# Patient Record
Sex: MALE | Race: OTHER | Employment: FULL TIME | URBAN - METROPOLITAN AREA
[De-identification: names, ages, dates, MRNs, and addresses within clinical notes are randomized per-mention and may not be internally consistent; named-entity substitution may affect disease eponyms.]

---

## 2019-05-23 ENCOUNTER — HOSPITAL ENCOUNTER (EMERGENCY)
Age: 19
Discharge: HOME | End: 2019-05-23
Attending: EMERGENCY MEDICINE | Admitting: EMERGENCY MEDICINE
Payer: SUBSIDIZED

## 2019-05-23 VITALS
TEMPERATURE: 99.9 F | DIASTOLIC BLOOD PRESSURE: 70 MMHG | OXYGEN SATURATION: 99 % | WEIGHT: 135 LBS | RESPIRATION RATE: 16 BRPM | SYSTOLIC BLOOD PRESSURE: 124 MMHG | HEART RATE: 100 BPM

## 2019-05-23 DIAGNOSIS — Z23 NEED FOR TETANUS BOOSTER: ICD-10-CM

## 2019-05-23 DIAGNOSIS — L05.91 PILONIDAL CYST WITHOUT ABSCESS: Primary | ICD-10-CM

## 2019-05-23 PROCEDURE — 74011250637 HC RX REV CODE- 250/637: Performed by: PHYSICIAN ASSISTANT

## 2019-05-23 PROCEDURE — 77030019895 HC PCKNG STRP IODO -A

## 2019-05-23 PROCEDURE — 90715 TDAP VACCINE 7 YRS/> IM: CPT | Performed by: PHYSICIAN ASSISTANT

## 2019-05-23 PROCEDURE — 75810000289 HC I&D ABSCESS SIMP/COMP/MULT

## 2019-05-23 PROCEDURE — 90471 IMMUNIZATION ADMIN: CPT

## 2019-05-23 PROCEDURE — 99283 EMERGENCY DEPT VISIT LOW MDM: CPT

## 2019-05-23 PROCEDURE — 74011250636 HC RX REV CODE- 250/636: Performed by: PHYSICIAN ASSISTANT

## 2019-05-23 PROCEDURE — 74011000250 HC RX REV CODE- 250: Performed by: EMERGENCY MEDICINE

## 2019-05-23 RX ORDER — METRONIDAZOLE 500 MG/1
500 TABLET ORAL 2 TIMES DAILY
Qty: 14 TAB | Refills: 0 | Status: SHIPPED | OUTPATIENT
Start: 2019-05-23 | End: 2019-05-30

## 2019-05-23 RX ORDER — LIDOCAINE HYDROCHLORIDE AND EPINEPHRINE 10; 10 MG/ML; UG/ML
1.5 INJECTION, SOLUTION INFILTRATION; PERINEURAL
Status: COMPLETED | OUTPATIENT
Start: 2019-05-23 | End: 2019-05-23

## 2019-05-23 RX ORDER — METRONIDAZOLE 250 MG/1
500 TABLET ORAL
Status: COMPLETED | OUTPATIENT
Start: 2019-05-23 | End: 2019-05-23

## 2019-05-23 RX ORDER — CEPHALEXIN 250 MG/1
500 CAPSULE ORAL
Status: COMPLETED | OUTPATIENT
Start: 2019-05-23 | End: 2019-05-23

## 2019-05-23 RX ORDER — CEPHALEXIN 500 MG/1
500 CAPSULE ORAL 3 TIMES DAILY
Qty: 21 CAP | Refills: 0 | Status: SHIPPED | OUTPATIENT
Start: 2019-05-23 | End: 2019-05-30

## 2019-05-23 RX ADMIN — TETANUS TOXOID, REDUCED DIPHTHERIA TOXOID AND ACELLULAR PERTUSSIS VACCINE, ADSORBED 0.5 ML: 5; 2.5; 8; 8; 2.5 SUSPENSION INTRAMUSCULAR at 20:03

## 2019-05-23 RX ADMIN — METRONIDAZOLE 500 MG: 250 TABLET ORAL at 18:54

## 2019-05-23 RX ADMIN — CEPHALEXIN 500 MG: 250 CAPSULE ORAL at 18:54

## 2019-05-23 RX ADMIN — LIDOCAINE HYDROCHLORIDE,EPINEPHRINE BITARTRATE 15 MG: 10; .01 INJECTION, SOLUTION INFILTRATION; PERINEURAL at 19:28

## 2019-05-23 NOTE — DISCHARGE INSTRUCTIONS
Patient Education        Absceso pilonidal: Instrucciones de cuidado - [ Pilonidal Abscess: Care Instructions ]  Instrucciones de cuidado    Un absceso pilonidal es noe infección provocada por un pelo encarnado. Dimple absceso se presenta en la nika del cóccix (base de la columna) y en la parte superior de las nalgas. La infección hace que se forme un saco de pus. Eso puede ser bastante doloroso. Es posible que el médico haya abierto y [place]. Puede cuidarse en johnson hogar para ayudar a sanar la nika. En algunos casos, el absceso vuelve a aparecer. Si es así, el médico podría sugerirle que se opere para eliminar el sitio de la infección. Es posible que le hayan dado un sedante para ayudarle a relajarse. Usted podría estar algo tambaleante después de la sedación. Pueden pasar algunas horas hasta que los efectos del medicamento desaparezcan. Los efectos secundarios comunes de la sedación incluyen náuseas, vómitos y sensación de somnolencia o cansancio. El médico lo morales examinado minuciosamente, zion pueden presentarse problemas más tarde. Si nota algún problema o nuevos síntomas, busque tratamiento médico de inmediato. La atención de seguimiento es noe parte clave de johnson tratamiento y seguridad. Asegúrese de hacer y acudir a todas las citas, y llame a johnson médico si está teniendo problemas. También es noe buena idea saber los resultados de daniel exámenes y mantener noe lista de los medicamentos que sarmad. ¿Cómo puede cuidarse en el hogar? · Si el médico le ladonna un sedante:  ? Mario 24 horas, no matthew nada que requiera prestar atención a detalles. Hace falta tiempo hasta que los efectos del medicamento desaparezcan por completo. ? Para johnson seguridad, no conduzca ni opere maquinaria que pudiera ser Remonia Marinas. Espere hasta que los efectos del medicamento desaparezcan y usted pueda pensar con claridad y reaccionar con facilidad. · Si johnson médico le recetó antibióticos, tómelos exactamente según las indicaciones.  No deje de tomarlos solo porque se sienta mejor. Debe kallie todos los antibióticos hasta terminarlos. · Sea karen con los medicamentos. Warrens los analgésicos (medicamentos para el dolor) exactamente según lo indicado. ? Si el [employer] ladonna un analgésico recetado, tómelo según las indicaciones. ? Si no está tomando un analgésico recetado, pregúntele a johnson médico si puede kallie fernando de [employer]. · Si el médico abrió y drenó el absceso, usted podría tener gasa u otro material dentro de la herida. Siga todas las instrucciones del médico sobre cómo cuidar la herida. · Mantenga la nika de la herida muy limpia. Use bolitas de algodón húmedo, un paño tibio o toallitas para bebé. Limpie la nika suavemente, especialmente después de evacuar el intestino. ¿Cuándo debe pedir ayuda? Llame al 911 en cualquier momento que considere que necesita atención de [employer]. Por ejemplo, llame si:    · Tiene dificultad para respirar.     · Se desmayó (perdió el conocimiento).    Llame a johnson médico ahora mismo o busque atención médica inmediata si:    · Tiene náuseas o vómito nuevos o peores.     · Tiene síntomas de infección, tales neetu:  ? Aumento del dolor, la hinchazón, la temperatura o el enrojecimiento. ? Vetas rojizas que salen de la nika. ? Pus que sale de la nika. ? Daysi Silver especial atención a los cambios en johnson nathaniel y asegúrese de comunicarse con johnson médico si:    · No mejora neetu se esperaba. ¿Dónde puede encontrar más información en inglés? Reita [place] a [email or web address] [email or web address]-omar.[email or web address] Niels Makeda K715 en la búsqueda para aprender más acerca de \"Absceso pilonidal: Instrucciones de cuidado - [ Pilonidal Abscess: Care Instructions ]. \"  Revisado: 17 chauncey, 2018  Versión del contenido: 11.9  © 8770-9393 [place], [employer]. Las instrucciones de cuidado fueron adaptadas bajo licencia por [employer] (which disclaims liability or warranty for this information).  Si usted tiene [place] afección médica o sobre estas instrucciones, siempre pregunte a johnson profesional de nathaniel. [facility] niega toda garantía o responsabilidad por johnson uso de esta información.

## 2019-05-23 NOTE — ED PROVIDER NOTES
I have evaluated the patient as the Provider in Triage. I have reviewed His vital signs and the triage nurse assessment. I have talked with the patient and any available family and advised that I am the provider in triage and have ordered the appropriate study to initiate their work up based on the clinical presentation during my assessment. I have advised that the patient will be accommodated in the Main ED as soon as possible. I have also requested to contact the triage nurse or myself immediately if the patient experiences any changes in their condition during this brief waiting period. Pt reports an abscess to his gluteal cleft since yesterday. Pt notes associated itchiness. Pt denies similar symptoms in the past. Pt denies fever, chills, or N/V, abd pain. No hx of diabetes. Note written by Isabella Rueda, as dictated by Chris Del Rio MD 5:58 PM             No past medical history on file. No past surgical history on file. No family history on file.     Social History     Socioeconomic History    Marital status: Not on file     Spouse name: Not on file    Number of children: Not on file    Years of education: Not on file    Highest education level: Not on file   Occupational History    Not on file   Social Needs    Financial resource strain: Not on file    Food insecurity:     Worry: Not on file     Inability: Not on file    Transportation needs:     Medical: Not on file     Non-medical: Not on file   Tobacco Use    Smoking status: Not on file   Substance and Sexual Activity    Alcohol use: Not on file    Drug use: Not on file    Sexual activity: Not on file   Lifestyle    Physical activity:     Days per week: Not on file     Minutes per session: Not on file    Stress: Not on file   Relationships    Social connections:     Talks on phone: Not on file     Gets together: Not on file     Attends Yazidi service: Not on file     Active member of club or organization: Not on file     Attends meetings of clubs or organizations: Not on file     Relationship status: Not on file    Intimate partner violence:     Fear of current or ex partner: Not on file     Emotionally abused: Not on file     Physically abused: Not on file     Forced sexual activity: Not on file   Other Topics Concern    Not on file   Social History Narrative    Not on file         ALLERGIES: Patient has no known allergies. Review of Systems   Constitutional: Negative. Negative for activity change, chills, fatigue and unexpected weight change. HENT: Negative for trouble swallowing. Respiratory: Negative for cough, chest tightness, shortness of breath and wheezing. Cardiovascular: Negative. Negative for chest pain and palpitations. Gastrointestinal: Negative. Negative for abdominal pain, diarrhea, nausea and vomiting. Genitourinary: Negative. Negative for dysuria, flank pain, frequency and hematuria. Musculoskeletal: Negative. Negative for arthralgias, back pain, neck pain and neck stiffness. Skin: Positive for wound. Negative for color change and rash. Neurological: Negative. Negative for dizziness, numbness and headaches. All other systems reviewed and are negative. There were no vitals filed for this visit. Physical Exam   Constitutional: He is oriented to person, place, and time. He appears well-developed and well-nourished. He is active. Non-toxic appearance. No distress. HENT:   Head: Normocephalic and atraumatic. Eyes: Pupils are equal, round, and reactive to light. Conjunctivae are normal. Right eye exhibits no discharge. Left eye exhibits no discharge. Neck: Normal range of motion and full passive range of motion without pain. No tracheal tenderness present. Cardiovascular: Normal rate, regular rhythm, normal heart sounds, intact distal pulses and normal pulses. Exam reveals no gallop and no friction rub. No murmur heard.   Pulmonary/Chest: Effort normal and breath sounds normal. No respiratory distress. He has no wheezes. He has no rales. He exhibits no tenderness. Abdominal: Soft. Bowel sounds are normal. He exhibits no distension. There is no tenderness. There is no rebound and no guarding. Genitourinary:   Genitourinary Comments: Gluteal cleft on the left with induration, small porous opening with blood-tinged purulent drainage expressed, +TTP. Musculoskeletal: Normal range of motion. He exhibits no edema or tenderness. Neurological: He is alert and oriented to person, place, and time. He has normal strength. No cranial nerve deficit or sensory deficit. Coordination normal.   Skin: Skin is warm, dry and intact. Capillary refill takes less than 2 seconds. No abrasion and no rash noted. He is not diaphoretic. No erythema. Psychiatric: He has a normal mood and affect. His speech is normal and behavior is normal. Cognition and memory are normal.   Nursing note and vitals reviewed. MDM  Number of Diagnoses or Management Options  Diagnosis management comments:   Ddx: pilonidal abscess/cyst       Amount and/or Complexity of Data Reviewed  Review and summarize past medical records: yes  Discuss the patient with other providers: yes    Patient Progress  Patient progress: stable         Procedures      I discussed patient's PMH, exam findings as well as careplan with the ER attending who agrees with care plan. Percy Argueta PA-C     # [id number]  Procedure Note - Incision and Drainage:   7:25 PM  Performed by: Percy Argueta PA-C  Complexity: complex (probed)  Skin prepped with Betadine. Sterile field established. Anesthesia achieved with 5 mLs of Lidocaine 1% with epinephrine using a local infiltration. Abscess to left gluteal cleft was incised over the previous spontaneous pinpoint opening with # 11 blade, and 1mLs of purulent drainage was expressed. Wound probed and irrigated. Area was packed using 1/2 inch iodoform gauze.   Sterile dressing applied. Estimated blood loss: <1mL  The procedure took 1-15 minutes, and pt tolerated well. MEDICATIONS GIVEN:  Medications   diph,Pertuss(AC),Tet Vac-PF (BOOSTRIX) suspension 0.5 mL (has no administration in time range)   lidocaine-EPINEPHrine (XYLOCAINE) 1 %-1:100,000 injection 15 mg (15 mg IntraDERMal Given by Provider 5/23/19 1928)   cephALEXin (KEFLEX) capsule 500 mg (500 mg Oral Given 5/23/19 1854)   metroNIDAZOLE (FLAGYL) tablet 500 mg (500 mg Oral Given 5/23/19 1854)         DISCHARGE NOTE:  7:55 PM  The patient's results have been reviewed with them and/or available family. Patient and/or family verbally conveyed their understanding and agreement of the patient's signs, symptoms, diagnosis, treatment and prognosis and additionally agree to follow up as recommended in the discharge instructions or to return to the Emergency Room should their condition change prior to their follow-up appointment. The patient/family verbally agrees with the care-plan and verbally conveys that all of their questions have been answered. The discharge instructions have also been provided to the patient and/or family with some educational information regarding the patient's diagnosis as well a list of reasons why the patient would want to return to the ER prior to their follow-up appointment, should their condition change. Plan:  1. F/U with general surgery due to recurrence risk, for further management  2. Rx flagyl, keflex due to borderline tachycardia, temp 99.9 and overlying cellulitis  3. Discussed ibuprofen, tylenol  4.  Return precautions discussed and advised to return to ER if worse